# Patient Record
Sex: FEMALE | Race: WHITE | NOT HISPANIC OR LATINO | Employment: FULL TIME | URBAN - METROPOLITAN AREA
[De-identification: names, ages, dates, MRNs, and addresses within clinical notes are randomized per-mention and may not be internally consistent; named-entity substitution may affect disease eponyms.]

---

## 2017-03-07 ENCOUNTER — OFFICE VISIT (OUTPATIENT)
Dept: GASTROENTEROLOGY | Facility: CLINIC | Age: 37
End: 2017-03-07
Payer: MEDICAID

## 2017-03-07 ENCOUNTER — LAB VISIT (OUTPATIENT)
Dept: LAB | Facility: HOSPITAL | Age: 37
End: 2017-03-07
Attending: NURSE PRACTITIONER
Payer: MEDICAID

## 2017-03-07 VITALS
SYSTOLIC BLOOD PRESSURE: 112 MMHG | RESPIRATION RATE: 20 BRPM | WEIGHT: 121.5 LBS | BODY MASS INDEX: 20.74 KG/M2 | DIASTOLIC BLOOD PRESSURE: 79 MMHG | HEART RATE: 85 BPM | HEIGHT: 64 IN

## 2017-03-07 DIAGNOSIS — R10.9 LEFT SIDED ABDOMINAL PAIN: ICD-10-CM

## 2017-03-07 DIAGNOSIS — Z87.19 HISTORY OF HEMORRHOIDS: ICD-10-CM

## 2017-03-07 DIAGNOSIS — R10.819 SUPRAPUBIC TENDERNESS: ICD-10-CM

## 2017-03-07 DIAGNOSIS — K92.1 HEMATOCHEZIA: ICD-10-CM

## 2017-03-07 DIAGNOSIS — Z80.0 FAMILY HISTORY OF COLON CANCER: ICD-10-CM

## 2017-03-07 DIAGNOSIS — K92.1 HEMATOCHEZIA: Primary | ICD-10-CM

## 2017-03-07 LAB
ALBUMIN SERPL BCP-MCNC: 4.2 G/DL
ALP SERPL-CCNC: 52 U/L
ALT SERPL W/O P-5'-P-CCNC: 6 U/L
ANION GAP SERPL CALC-SCNC: 7 MMOL/L
AST SERPL-CCNC: 19 U/L
B-HCG UR QL: NEGATIVE
BACTERIA #/AREA URNS HPF: NORMAL /HPF
BASOPHILS # BLD AUTO: 0 K/UL
BASOPHILS NFR BLD: 0.3 %
BILIRUB SERPL-MCNC: 0.5 MG/DL
BILIRUB UR QL STRIP: NEGATIVE
BUN SERPL-MCNC: 8 MG/DL
CALCIUM SERPL-MCNC: 9.2 MG/DL
CHLORIDE SERPL-SCNC: 104 MMOL/L
CLARITY UR: CLEAR
CO2 SERPL-SCNC: 28 MMOL/L
COLOR UR: YELLOW
CREAT SERPL-MCNC: 0.7 MG/DL
DIFFERENTIAL METHOD: ABNORMAL
EOSINOPHIL # BLD AUTO: 0.1 K/UL
EOSINOPHIL NFR BLD: 1.2 %
ERYTHROCYTE [DISTWIDTH] IN BLOOD BY AUTOMATED COUNT: 13.4 %
EST. GFR  (AFRICAN AMERICAN): >60 ML/MIN/1.73 M^2
EST. GFR  (NON AFRICAN AMERICAN): >60 ML/MIN/1.73 M^2
GLUCOSE SERPL-MCNC: 75 MG/DL
GLUCOSE UR QL STRIP: NEGATIVE
HCT VFR BLD AUTO: 41.8 %
HGB BLD-MCNC: 13.8 G/DL
HGB UR QL STRIP: ABNORMAL
KETONES UR QL STRIP: NEGATIVE
LEUKOCYTE ESTERASE UR QL STRIP: NEGATIVE
LYMPHOCYTES # BLD AUTO: 2.2 K/UL
LYMPHOCYTES NFR BLD: 28.7 %
MCH RBC QN AUTO: 30.5 PG
MCHC RBC AUTO-ENTMCNC: 33.1 %
MCV RBC AUTO: 92 FL
MICROSCOPIC COMMENT: NORMAL
MONOCYTES # BLD AUTO: 0.4 K/UL
MONOCYTES NFR BLD: 5 %
NEUTROPHILS # BLD AUTO: 5 K/UL
NEUTROPHILS NFR BLD: 64.8 %
NITRITE UR QL STRIP: NEGATIVE
PH UR STRIP: 6 [PH] (ref 5–8)
PLATELET # BLD AUTO: 259 K/UL
PMV BLD AUTO: 7.4 FL
POTASSIUM SERPL-SCNC: 3.8 MMOL/L
PROT SERPL-MCNC: 6.6 G/DL
PROT UR QL STRIP: NEGATIVE
RBC # BLD AUTO: 4.54 M/UL
RBC #/AREA URNS HPF: 1 /HPF (ref 0–4)
SODIUM SERPL-SCNC: 139 MMOL/L
SP GR UR STRIP: <=1.005 (ref 1–1.03)
URN SPEC COLLECT METH UR: ABNORMAL
UROBILINOGEN UR STRIP-ACNC: NEGATIVE EU/DL
WBC # BLD AUTO: 7.6 K/UL

## 2017-03-07 PROCEDURE — 99213 OFFICE O/P EST LOW 20 MIN: CPT | Mod: PBBFAC,PO | Performed by: NURSE PRACTITIONER

## 2017-03-07 PROCEDURE — 99999 PR PBB SHADOW E&M-EST. PATIENT-LVL III: CPT | Mod: PBBFAC,,, | Performed by: NURSE PRACTITIONER

## 2017-03-07 PROCEDURE — 81000 URINALYSIS NONAUTO W/SCOPE: CPT

## 2017-03-07 PROCEDURE — 81025 URINE PREGNANCY TEST: CPT

## 2017-03-07 PROCEDURE — 80053 COMPREHEN METABOLIC PANEL: CPT

## 2017-03-07 PROCEDURE — 36415 COLL VENOUS BLD VENIPUNCTURE: CPT

## 2017-03-07 PROCEDURE — 99214 OFFICE O/P EST MOD 30 MIN: CPT | Mod: S$PBB,,, | Performed by: NURSE PRACTITIONER

## 2017-03-07 PROCEDURE — 85025 COMPLETE CBC W/AUTO DIFF WBC: CPT

## 2017-03-07 RX ORDER — HYDROCORTISONE ACETATE 25 MG/1
25 SUPPOSITORY RECTAL 2 TIMES DAILY
Qty: 20 SUPPOSITORY | Refills: 0 | Status: SHIPPED | OUTPATIENT
Start: 2017-03-07 | End: 2017-03-10

## 2017-03-07 NOTE — PROGRESS NOTES
Subjective:       Patient ID: Mehnaz Taveras is a 36 y.o. female Body mass index is 20.85 kg/(m^2).    Chief Complaint: Rectal Bleeding    This patient is new to me.  Established patient of Dr. Rojas.    GI Problem   The primary symptoms include abdominal pain (lower abdomen, denies currently) and hematochezia. Primary symptoms do not include fever, weight loss, fatigue, nausea, vomiting, diarrhea, melena, hematemesis or dysuria. Episode onset: started in 6/2016 and saw Dr. Rojas in 7/2016; had colonoscopy and bleeding had improved but never completely resolved; increased amount and frequency over the past 3 weeks. The problem has been gradually worsening.   The abdominal pain began more than 2 days ago (started about 2-3 weeks ago; prior ct scan had showed colitis (this was done prior to the colonoscopy)). The abdominal pain has been unchanged since its onset. Pain location: left-sided and lower abomen. The abdominal pain does not radiate. The severity of the abdominal pain is 0/10. The abdominal pain is relieved by nothing.   The hematochezia began more than 1 week ago. The hematochezia has occurred 2 to 5 times per day (bright red blood with each bowel movement which is about 5 times a day; denies bleeding in between bowel movements; reports blood noted in stool, on toilet paper and in toilet bowl; occasional blood clots noted). The hematochezia is a recurrent problem.   The illness does not include chills, anorexia, dysphagia, odynophagia or constipation. Significant associated medical issues include hemorrhoids. Associated medical issues do not include inflammatory bowel disease, GERD, bowel resection, irritable bowel syndrome or diverticulitis.     Review of Systems   Constitutional: Negative for appetite change, chills, fatigue, fever, unexpected weight change and weight loss.   HENT: Negative for sore throat and trouble swallowing.    Respiratory: Negative for cough, choking, chest tightness and  "shortness of breath.    Cardiovascular: Negative for chest pain and palpitations.   Gastrointestinal: Positive for abdominal pain (lower abdomen, denies currently), anal bleeding, blood in stool and hematochezia. Negative for anorexia, constipation, diarrhea, dysphagia, hematemesis, melena, nausea, rectal pain and vomiting.   Genitourinary: Negative for difficulty urinating, dysuria, flank pain, frequency, pelvic pain and urgency.   Neurological: Negative for weakness.       Past Medical History:   Diagnosis Date    Seizures     last seizure approx 1 year ago    Smoker      Past Surgical History:   Procedure Laterality Date    COLONOSCOPY N/A 8/8/2016    Procedure: COLONOSCOPY;  Surgeon: Henri Rojas MD;  Location: [facility];  Service: Endoscopy;  Laterality: N/A; repeat in 5 years    septal surgery      TONSILLECTOMY      TUBAL LIGATION      UPPER GASTROINTESTINAL ENDOSCOPY  08/17/2016    Dr. Rojas     Family History   Problem Relation Age of Onset    Cancer Mother      breast & uterine    Colon cancer Maternal Aunt 32    Colon cancer Maternal Grandfather      diagnosed in his 50's    Crohn's disease Neg Hx     Ulcerative colitis Neg Hx      Wt Readings from Last 10 Encounters:   03/07/17 55.1 kg (121 lb 7.6 oz)   08/08/16 54.4 kg (120 lb)   07/28/16 52 kg (114 lb 10.2 oz)     8/8/16 Colonoscopy was reviewed and procedure report states:   " Impression:          - Non-bleeding internal hemorrhoids.                       - The rectum, sigmoid colon, descending colon,                        transverse colon, ascending colon, cecum,                        appendiceal orifice and ileocecal valve are normal.                       - The examined portion of the ileum was normal.                       - No specimens collected.  Recommendation:      - Patient has a contact number available for                        emergencies. The signs and symptoms of potential                        delayed " "complications were discussed with the                        patient. Return to normal activities tomorrow.                        Written discharge instructions were provided to the                        patient.                       - High fiber diet.                       - Continue present medications.                       - Repeat colonoscopy in 5 years for screening                        purposes.                       - Discharge patient to home (ambulatory).                       - Perform an upper GI endoscopy at appointment to be                        scheduled.                       - Return to my office after studies are complete. ".    8/17/16 EGD was reviewed and procedure report states:   " Impression:          - Normal esophagus.                       - Z-line regular, 36 cm from the incisors.                       - Medium-sized hiatus hernia.                       - Gastritis. Biopsied.                       - Normal examined duodenum. Biopsied.  Recommendation:      - Patient has a contact number available for                        emergencies. The signs and symptoms of potential                        delayed complications were discussed with the                        patient. Return to normal activities tomorrow.                        Written discharge instructions were provided to the                        patient.                       - Resume previous diet.                       - Continue present medications.                       - No aspirin, ibuprofen, naproxen, or other                        non-steroidal anti-inflammatory drugs.                       - Await pathology results.                       - Discharge patient to home (ambulatory).                       - Return to my office in 6 weeks. ".  Biopsy results:   "1. Duodenum, biopsy:  Unremarkable small bowel mucosa with adequate villi.  Negative for active inflammation.  Negative for dysplasia.  2. Stomach, antrum and " "body, biopsy:  Mild chronic nonactive gastritis.  Negative for H. pylori by routine stain.  Negative for intestinal metaplasia and dysplasia."    Objective:      Physical Exam   Constitutional: She is oriented to person, place, and time. She appears well-developed and well-nourished. No distress.   HENT:   Mouth/Throat: Oropharynx is clear and moist and mucous membranes are normal. No oral lesions. No oropharyngeal exudate.   Eyes: Conjunctivae are normal. Pupils are equal, round, and reactive to light. No scleral icterus.   Neck: Normal range of motion. Neck supple. No tracheal deviation present.   Cardiovascular: Normal rate.    Pulmonary/Chest: Effort normal and breath sounds normal. No respiratory distress. She has no wheezes.   Abdominal: Soft. Normal appearance and bowel sounds are normal. She exhibits no distension, no abdominal bruit, no ascites and no mass. There is no hepatosplenomegaly. There is tenderness (mild) in the suprapubic area. There is no rigidity, no rebound, no guarding, no tenderness at McBurney's point and negative Rizzo's sign. No hernia.   Patient declined rectal exam.   Lymphadenopathy:     She has no cervical adenopathy.   Neurological: She is alert and oriented to person, place, and time.   Skin: Skin is warm and dry. No rash noted. She is not diaphoretic. No erythema. No pallor.   Non-jaundiced   Psychiatric: She has a normal mood and affect. Her behavior is normal.   Nursing note and vitals reviewed.      Assessment:       1. Hematochezia    2. History of hemorrhoids    3. Family history of colon cancer    4. Left sided abdominal pain    5. Suprapubic tenderness        Plan:       Hematochezia  -     CBC auto differential; Future; Expected date: 3/7/17  -     CT Abdomen Pelvis With Contrast; Future; Expected date: 3/7/17  - discussed about different etiologies that can cause rectal bleeding, such as diverticulosis, polyps, colon inflammation or infection, anal fissure or hemorrhoids. "   - discussed results of recent colonoscopy, patient verbalized understanding and declined lower endoscopy for now and would like to try to treat the hemorrhoid first.  - You may resume normal activity as long as you feel well.  - Avoid/minimize aspirin and anti-inflammatory drugs such as ibuprofen (Advil, Motrin) and naproxen (Aleve and Naprosyn).  - Avoid alcohol.    History of hemorrhoids  -  START  hydrocortisone (ANUSOL-HC) 25 mg suppository; Place 1 suppository (25 mg total) rectally 2 (two) times daily.  Dispense: 20 suppository; Refill: 0  - avoid constipation and straining with bowel movements; try using an OTC stool softener as directed and increase fiber in diet (20-30 grams daily)  - recommend SITZ baths  - possible referral to general surgery if symptoms persist    Family history of colon cancer  - next surveillance colonoscopy due 8/2021    Left sided abdominal pain  -     CBC auto differential; Future; Expected date: 3/7/17  -     Comprehensive metabolic panel; Future; Expected date: 3/7/17  -     CT Abdomen Pelvis With Contrast; Future; Expected date: 3/7/17  -     Pregnancy, urine rapid; Future; Expected date: 3/7/17  -     Urinalysis; Future; Expected date: 3/7/17    Suprapubic tenderness  -     CBC auto differential; Future; Expected date: 3/7/17  -     Comprehensive metabolic panel; Future; Expected date: 3/7/17  -     CT Abdomen Pelvis With Contrast; Future; Expected date: 3/7/17  -     Pregnancy, urine rapid; Future; Expected date: 3/7/17  -     Urinalysis; Future; Expected date: 3/7/17    Return in about 1 month (around 4/7/2017), or if symptoms worsen or fail to improve.    If no improvement in symptoms or symptoms worsen, call/follow-up at clinic or go to ER.

## 2017-03-07 NOTE — MR AVS SNAPSHOT
[facility]  [address]  [place] [address]  Phone: [phone]                  Mehnaz Taveras   3/7/2017 1:30 PM   Office Visit    Description:  Female : 1980   Provider:  MAIDA Davis   Department:  [facility]           Reason for Visit     Rectal Bleeding           Diagnoses this Visit        Comments    Hematochezia    -  Primary     History of hemorrhoids         Family history of colon cancer         Left sided abdominal pain         Suprapubic tenderness                To Do List           Goals (5 Years of Data)     None      Follow-Up and Disposition     Return in about 1 month (around 2017), or if symptoms worsen or fail to improve.       These Medications        Disp Refills Start End    hydrocortisone (ANUSOL-HC) 25 mg suppository 20 suppository 0 3/7/2017 3/17/2017    Place 1 suppository (25 mg total) rectally 2 (two) times daily. - Rectal    Pharmacy: [id number] - [place] - [id number]  #: [phone]         [facility]     [facility] On [facility] Nurse Care Line -  Assistance  Registered nurses in the [facility] Center provide clinical advisement, health education, appointment booking, and other advisory services.  Call for this free service at [phone].             Medications           Message regarding Medications     Verify the changes and/or additions to your medication regime listed below are the same as discussed with your clinician today.  If any of these changes or additions are incorrect, please notify your healthcare provider.        START taking these NEW medications        Refills    hydrocortisone (ANUSOL-HC) 25 mg suppository 0    Sig: Place 1 suppository (25 mg total) rectally 2 (two) times daily.    Class: Normal    Route: Rectal      STOP taking these medications     ciprofloxacin HCl (CIPRO) 500 MG tablet     metronidazole (FLAGYL) 500 MG tablet      "       Verify that the below list of medications is an accurate representation of the medications you are currently taking.  If none reported, the list may be blank. If incorrect, please contact your healthcare provider. Carry this list with you in case of emergency.           Current Medications     hydrocortisone (ANUSOL-HC) 25 mg suppository Place 1 suppository (25 mg total) rectally 2 (two) times daily.           Clinical Reference Information           Your Vitals Were     BP Pulse Resp Height Weight Last Period    112/79 (BP Location: Right arm, Patient Position: Sitting, BP Method: Automatic) 85 20 5' 4" (1.626 m) 55.1 kg (121 lb 7.6 oz) 03/07/2017    BMI                20.85 kg/m2          Blood Pressure          Most Recent Value    BP  112/79      Allergies as of 3/7/2017     No Known Allergies      Immunizations Administered on Date of Encounter - 3/7/2017     None      Orders Placed During Today's Visit     Future Labs/Procedures Expected by Expires    CBC auto differential  3/7/2017 5/6/2018    Comprehensive metabolic panel  3/7/2017 5/6/2018    CT Abdomen Pelvis With Contrast  3/7/2017 3/7/2018    Pregnancy, urine rapid  3/7/2017 5/6/2018    Urinalysis  3/7/2017 5/6/2018      [facility] Sign-Up     Activating your [facility] account is as easy as 1-2-3!     1) Visit [email or web address], select Sign Up Now, enter this activation code and your date of birth, then select Next.  [email or web address]  Expires: 4/21/2017  2:10 PM      2) Create a username and password to use when you visit [facility] in the future and select a security question in case you lose your password and select Next.    3) Enter your e-mail address and click Sign Up!    Additional Information  If you have questions, please e-mail [email or web address] or call [phone] to talk to our [facility] staff. Remember, [facility] is NOT to be used for urgent needs. For medical emergencies, dial 911.         Instructions      Lower GI Bleeding " (Stable)  You have signs of blood in your stool. This is called rectal bleeding. The bleeding may have begun in another part of your gastrointestinal (GI) tract. If the blood is bright red, it is likely coming from the lower part of the GI tract. If the blood is black or dark, it might be coming from higher up in the GI tract. Very small amounts of GI bleeding may not be visible and can only be discovered during a test on your stool. Possible causes of lower GI bleeding include:  · Hemorrhoids  · Anal fissures  · Diverticulitis  · Inflammatory bowel disease (Crohn's disease or ulcerative colitis)  · Polyps (growths) in the intestine  Note: Iron supplements and medicines for diarrhea or upset stomach can cause black stools. Foods such as licorice and red beets can also discolor the stool and be mistaken for bleeding. These are not bleeding and are not a cause for alarm.  Home care  You have not lost a large amount of blood and your condition appears stable at this time. You may resume normal activity as long as you feel well.  Avoid NSAIDs, such as aspirin, ibuprofen, or naproxen. They can irritate the stomach and cause further bleeding. If you are taking these medicines for other medical reasons, talk to your healthcare provider before you stop them.   Follow-up care  Follow up with your healthcare provider as advised. Further tests may be needed to find the cause of your bleeding.  When to seek medical advice  Call your healthcare provider for any of the following:  · Large amount of rectal bleeding   · Increasing abdominal pain  · Weakness, dizziness  Call 911  Get emergency medical care if any of the following occur:  · Loss of consciousness  · Vomiting blood  Date Last Reviewed: 6/24/2015 © 2000-2016 [employer]. [address] [place] [address], [place] [address]. All rights reserved. This information is not intended as a substitute for professional medical care. Always follow your healthcare  professional's instructions.        Hemorrhoids    Hemorrhoids are swollen and inflamed veins inside the rectum and near the anus. The rectum is the last several inches of the colon. The anus is the passage between the rectum and the outside of the body.  Causes  The veins can become swollen due to increased pressure in them. This is most often caused by:  · Chronic constipation or diarrhea  · Straining when having a bowel movement  · Sitting too long on the toilet  · A low-fiber diet  · Pregnancy  Symptoms  · Bleeding from the rectum (this may be noticeable after bowel movements)  · Lump near the anus  · Itching around the anus  · Pain around the anus  There are different types of hemorrhoids. Depending on the type you have and the severity, you may be able to treat yourself at home. In some cases, a procedure may be the best treatment option. Your healthcare provider can tell you more about this, if needed.  Home care  General care  · To get relief from pain or itching, try:  ¨ Topical products. Your healthcare provider may prescribe or recommend creams, ointments, or pads that can be applied to the hemorrhoid. Use these exactly as directed.  ¨ Medicines. Your healthcare provider may recommend stool softeners, suppositories, or laxatives to help manage constipation. Use these exactly as directed.  ¨ Sitz baths. A sitz bath involves sitting in a few inches of warm bath water. Be careful not to make the water so hot that you burn yourself--test it before sitting in it. Soak for about 10 to 15 minutes a few times a day. This may help relieve pain.  Tips to help prevent hemorrhoids  · Eat more fiber. Fiber adds bulk to stool and absorbs water as it moves through your colon. This makes stool softer and easier to pass.  ¨ Increase the fiber in your diet with more fiber-rich foods. These include fresh fruit, vegetables, and whole grains.  ¨ Take a fiber supplement or bulking agent, if advised to by your provider. These  include products such as psyllium or methylcellulose.  · Drink plenty of water, if directed to by your provider. This can help keep stool soft.  · Be more active. Frequent exercise aids digestion and helps prevent constipation. It may also help make bowel movements more regular.  · Dont strain during bowel movements. This can make hemorrhoids more likely. Also, dont sit on the toilet for long periods of time.  Follow-up care  Follow up with your healthcare provider, or as advised. If a culture or imaging tests were done, you will be notified of the results when they are ready. This may take a few days or longer.  When to seek medical advice  Call your healthcare provider right away if any of these occur:  · Increased bleeding from the rectum  · Increased pain around the rectum or anus  · Weakness or dizziness  Call 911  Call 911 or return to the emergency department right away if any of these occur:  · Trouble breathing or swallowing  · Fainting or loss of consciousness  · Unusually fast heart rate  · Vomiting blood  · Large amounts of blood in stool  Date Last Reviewed: 6/22/2015 © 2000-2016 [employer]. [address] [place] [address]. All rights reserved. This information is not intended as a substitute for professional medical care. Always follow your healthcare professional's instructions.             Smoking Cessation     If you would like to quit smoking:   You may be eligible for free services if you are a [place] resident and started smoking cigarettes before September 1, 1988.  Call the [employer]oking [employer]essation [employer] ([employer]) toll free at (707) 255-9927 or (107) 466-1106.   Call 1-800-QUIT-NOW if you do not meet the above criteria.            Language Assistance Services     ATTENTION: Language assistance services are available, free of charge. Please call [phone].      ATENCIÓN: Si habla español, tiene a tamez disposición servicios gratuitos de asistencia lingüística. Llame al  [phone].     ESTEFANÍA Ý: N?u b?n nói Ti?ng Vi?t, có các d?ch v? h? tr? ngôn ng? mi?n phí dành cho b?n. G?i s? [phone].         [facility] complies with applicable Federal civil rights laws and does not discriminate on the basis of race, color, national origin, age, disability, or sex.

## 2017-03-07 NOTE — PATIENT INSTRUCTIONS
Lower GI Bleeding (Stable)  You have signs of blood in your stool. This is called rectal bleeding. The bleeding may have begun in another part of your gastrointestinal (GI) tract. If the blood is bright red, it is likely coming from the lower part of the GI tract. If the blood is black or dark, it might be coming from higher up in the GI tract. Very small amounts of GI bleeding may not be visible and can only be discovered during a test on your stool. Possible causes of lower GI bleeding include:  · Hemorrhoids  · Anal fissures  · Diverticulitis  · Inflammatory bowel disease (Crohn's disease or ulcerative colitis)  · Polyps (growths) in the intestine  Note: Iron supplements and medicines for diarrhea or upset stomach can cause black stools. Foods such as licorice and red beets can also discolor the stool and be mistaken for bleeding. These are not bleeding and are not a cause for alarm.  Home care  You have not lost a large amount of blood and your condition appears stable at this time. You may resume normal activity as long as you feel well.  Avoid NSAIDs, such as aspirin, ibuprofen, or naproxen. They can irritate the stomach and cause further bleeding. If you are taking these medicines for other medical reasons, talk to your healthcare provider before you stop them.   Follow-up care  Follow up with your healthcare provider as advised. Further tests may be needed to find the cause of your bleeding.  When to seek medical advice  Call your healthcare provider for any of the following:  · Large amount of rectal bleeding   · Increasing abdominal pain  · Weakness, dizziness  Call 911  Get emergency medical care if any of the following occur:  · Loss of consciousness  · Vomiting blood  Date Last Reviewed: 6/24/2015  © 8244-8023 [employer]. [address] [place] [address]. All rights reserved. This information is not intended as a substitute for professional medical care. Always follow your  healthcare professional's instructions.        Hemorrhoids    Hemorrhoids are swollen and inflamed veins inside the rectum and near the anus. The rectum is the last several inches of the colon. The anus is the passage between the rectum and the outside of the body.  Causes  The veins can become swollen due to increased pressure in them. This is most often caused by:  · Chronic constipation or diarrhea  · Straining when having a bowel movement  · Sitting too long on the toilet  · A low-fiber diet  · Pregnancy  Symptoms  · Bleeding from the rectum (this may be noticeable after bowel movements)  · Lump near the anus  · Itching around the anus  · Pain around the anus  There are different types of hemorrhoids. Depending on the type you have and the severity, you may be able to treat yourself at home. In some cases, a procedure may be the best treatment option. Your healthcare provider can tell you more about this, if needed.  Home care  General care  · To get relief from pain or itching, try:  ¨ Topical products. Your healthcare provider may prescribe or recommend creams, ointments, or pads that can be applied to the hemorrhoid. Use these exactly as directed.  ¨ Medicines. Your healthcare provider may recommend stool softeners, suppositories, or laxatives to help manage constipation. Use these exactly as directed.  ¨ Sitz baths. A sitz bath involves sitting in a few inches of warm bath water. Be careful not to make the water so hot that you burn yourself--test it before sitting in it. Soak for about 10 to 15 minutes a few times a day. This may help relieve pain.  Tips to help prevent hemorrhoids  · Eat more fiber. Fiber adds bulk to stool and absorbs water as it moves through your colon. This makes stool softer and easier to pass.  ¨ Increase the fiber in your diet with more fiber-rich foods. These include fresh fruit, vegetables, and whole grains.  ¨ Take a fiber supplement or bulking agent, if advised to by your  provider. These include products such as psyllium or methylcellulose.  · Drink plenty of water, if directed to by your provider. This can help keep stool soft.  · Be more active. Frequent exercise aids digestion and helps prevent constipation. It may also help make bowel movements more regular.  · Dont strain during bowel movements. This can make hemorrhoids more likely. Also, dont sit on the toilet for long periods of time.  Follow-up care  Follow up with your healthcare provider, or as advised. If a culture or imaging tests were done, you will be notified of the results when they are ready. This may take a few days or longer.  When to seek medical advice  Call your healthcare provider right away if any of these occur:  · Increased bleeding from the rectum  · Increased pain around the rectum or anus  · Weakness or dizziness  Call 911  Call 911 or return to the emergency department right away if any of these occur:  · Trouble breathing or swallowing  · Fainting or loss of consciousness  · Unusually fast heart rate  · Vomiting blood  · Large amounts of blood in stool  Date Last Reviewed: 6/22/2015  © 8250-9917 [employer]. [address] [place] [address], [place] [address]. All rights reserved. This information is not intended as a substitute for professional medical care. Always follow your healthcare professional's instructions.

## 2017-03-08 ENCOUNTER — HOSPITAL ENCOUNTER (OUTPATIENT)
Dept: RADIOLOGY | Facility: HOSPITAL | Age: 37
Discharge: HOME | End: 2017-03-08
Attending: NURSE PRACTITIONER
Payer: MEDICAID

## 2017-03-08 DIAGNOSIS — R10.819 SUPRAPUBIC TENDERNESS: ICD-10-CM

## 2017-03-08 DIAGNOSIS — R10.9 LEFT SIDED ABDOMINAL PAIN: ICD-10-CM

## 2017-03-08 DIAGNOSIS — K92.1 HEMATOCHEZIA: ICD-10-CM

## 2017-03-08 PROCEDURE — 25500020 PHARM REV CODE 255

## 2017-03-08 PROCEDURE — 74177 CT ABD & PELVIS W/CONTRAST: CPT | Mod: 26,,, | Performed by: RADIOLOGY

## 2017-03-08 PROCEDURE — 74177 CT ABD & PELVIS W/CONTRAST: CPT | Mod: TC

## 2017-03-08 RX ADMIN — IOHEXOL 30 ML: 350 INJECTION, SOLUTION INTRAVENOUS at 02:03

## 2017-03-08 RX ADMIN — IOHEXOL 75 ML: 350 INJECTION, SOLUTION INTRAVENOUS at 02:03

## 2017-03-10 ENCOUNTER — TELEPHONE (OUTPATIENT)
Dept: GASTROENTEROLOGY | Facility: CLINIC | Age: 37
End: 2017-03-10

## 2017-03-10 DIAGNOSIS — R10.819 SUPRAPUBIC TENDERNESS: ICD-10-CM

## 2017-03-10 DIAGNOSIS — R10.9 LEFT SIDED ABDOMINAL PAIN: ICD-10-CM

## 2017-03-10 DIAGNOSIS — K92.1 HEMATOCHEZIA: ICD-10-CM

## 2017-03-10 DIAGNOSIS — K76.9 LIVER LESION: Primary | ICD-10-CM

## 2017-03-10 DIAGNOSIS — Z87.19 HISTORY OF HEMORRHOIDS: ICD-10-CM

## 2017-03-10 NOTE — TELEPHONE ENCOUNTER
Please call to inform & review the results with the patient- blood work showed no anemia, , good kidney and liver function levels, normal electrolytes, urinalysis showed occult positive blood in urine otherwise normal findings; recommend follow-up with PCP for continued evaluation and management of this finding. Urine pregnancy test negative.    CT scan showed no acute findings to explain symptoms overall. Liver showed small lesions nonspecific and need to further evaluate with ultrasound (possible hemangiomas which are typically benign collection of blood vessels). Abdominal & pelvis ultrasounds are in please schedule patient for this to further evaluate findings and symptoms.  Moderate amount of stool in colon which can suggest constipation. Recommended daily exercise, adequate water intake (six 8-oz glasses of water daily), and high fiber diet. OTC fiber supplements are recommended if diet does not reach daily fiber goal (25 grams daily), such as Metamucil, Citrucel, or FiberCon (take as directed, separate from other oral medications by >2 hours).  Otherwise normal findings. Continue with previous recommendations and the above    Thanks,  Raven CRUZ

## 2017-03-10 NOTE — TELEPHONE ENCOUNTER
Review test results and recommendations with pt. Scheduled ultrasounds. Inform pt of prescription change. Pt verbally understood.

## 2017-03-10 NOTE — TELEPHONE ENCOUNTER
Please call and inform patient that i sent in a prescription for proctofoam and go over results and recommendations of ct scan found in other note in this encounter.  Thanks  KASI

## 2017-03-10 NOTE — TELEPHONE ENCOUNTER
----- Message from Mati Ruiz LPN sent at 3/10/2017 12:21 PM CST -----  Contact: felix  Can Rx change to proctofoam? Or try OTC suppositories?  ----- Message -----     From: Bryant Wiseman     Sent: 3/10/2017   9:40 AM       To: [facility] Staff    Rx hydrocortisone (ANUSOL-HC) 25 mg suppository is not approved by insurance. Looking to see if she can have something else sent to     [id number] - [place] - [address]  [address] [place] [address]  Phone: [phone] Fax: [phone]    Please call also for CT SCAN results. [phone]. Thank you!

## 2017-03-16 ENCOUNTER — HOSPITAL ENCOUNTER (EMERGENCY)
Facility: HOSPITAL | Age: 37
Discharge: HOME | End: 2017-03-16
Attending: EMERGENCY MEDICINE
Payer: MEDICAID

## 2017-03-16 VITALS
HEIGHT: 64 IN | BODY MASS INDEX: 21.17 KG/M2 | DIASTOLIC BLOOD PRESSURE: 68 MMHG | TEMPERATURE: 97 F | HEART RATE: 80 BPM | WEIGHT: 124 LBS | SYSTOLIC BLOOD PRESSURE: 120 MMHG | OXYGEN SATURATION: 100 % | RESPIRATION RATE: 16 BRPM

## 2017-03-16 DIAGNOSIS — R07.89 ANTERIOR CHEST WALL PAIN: ICD-10-CM

## 2017-03-16 DIAGNOSIS — S49.91XA RIGHT SHOULDER INJURY, INITIAL ENCOUNTER: Primary | ICD-10-CM
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
## 2017-03-16 LAB
B-HCG UR QL: NEGATIVE
BACTERIA #/AREA URNS HPF: ABNORMAL /HPF
BILIRUB UR QL STRIP: NEGATIVE
CLARITY UR: CLEAR
COLOR UR: YELLOW
CTP QC/QA: YES
GLUCOSE UR QL STRIP: NEGATIVE
HGB UR QL STRIP: ABNORMAL
KETONES UR QL STRIP: NEGATIVE
LEUKOCYTE ESTERASE UR QL STRIP: NEGATIVE
MICROSCOPIC COMMENT: ABNORMAL
NITRITE UR QL STRIP: NEGATIVE
PH UR STRIP: 7 [PH] (ref 5–8)
PROT UR QL STRIP: NEGATIVE
RBC #/AREA URNS HPF: 5 /HPF (ref 0–4)
SP GR UR STRIP: 1.01 (ref 1–1.03)
SQUAMOUS #/AREA URNS HPF: 30 /HPF
URN SPEC COLLECT METH UR: ABNORMAL
UROBILINOGEN UR STRIP-ACNC: NEGATIVE EU/DL
WBC #/AREA URNS HPF: 2 /HPF (ref 0–5)

## 2017-03-16 PROCEDURE — 25500020 PHARM REV CODE 255

## 2017-03-16 PROCEDURE — 99285 EMERGENCY DEPT VISIT HI MDM: CPT | Mod: 25

## 2017-03-16 PROCEDURE — 25000003 PHARM REV CODE 250: Performed by: EMERGENCY MEDICINE

## 2017-03-16 PROCEDURE — 81025 URINE PREGNANCY TEST: CPT | Performed by: EMERGENCY MEDICINE

## 2017-03-16 PROCEDURE — 81000 URINALYSIS NONAUTO W/SCOPE: CPT

## 2017-03-16 RX ORDER — HYDROCODONE BITARTRATE AND ACETAMINOPHEN 5; 325 MG/1; MG/1
1 TABLET ORAL EVERY 8 HOURS PRN
Qty: 12 TABLET | Refills: 0 | Status: SHIPPED | OUTPATIENT
Start: 2017-03-16 | End: 2017-03-20

## 2017-03-16 RX ORDER — DIAZEPAM 5 MG/1
5 TABLET ORAL
Status: COMPLETED | OUTPATIENT
Start: 2017-03-16 | End: 2017-03-16

## 2017-03-16 RX ORDER — ACETAMINOPHEN 500 MG
1000 TABLET ORAL
Status: COMPLETED | OUTPATIENT
Start: 2017-03-16 | End: 2017-03-16

## 2017-03-16 RX ADMIN — DIAZEPAM 5 MG: 5 TABLET ORAL at 10:03

## 2017-03-16 RX ADMIN — IOHEXOL 75 ML: 350 INJECTION, SOLUTION INTRAVENOUS at 09:03

## 2017-03-16 RX ADMIN — ACETAMINOPHEN 1000 MG: 500 TABLET, FILM COATED ORAL at 10:03
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
## 2017-03-18 ENCOUNTER — HOSPITAL ENCOUNTER (OUTPATIENT)
Dept: RADIOLOGY | Facility: HOSPITAL | Age: 37
Discharge: HOME | End: 2017-03-18
Attending: NURSE PRACTITIONER
Payer: MEDICAID

## 2017-03-18 DIAGNOSIS — R10.9 LEFT SIDED ABDOMINAL PAIN: ICD-10-CM

## 2017-03-18 DIAGNOSIS — R10.819 SUPRAPUBIC TENDERNESS: ICD-10-CM

## 2017-03-18 DIAGNOSIS — K76.9 LIVER LESION: ICD-10-CM

## 2017-03-18 PROCEDURE — 76856 US EXAM PELVIC COMPLETE: CPT | Mod: 26,,, | Performed by: RADIOLOGY

## 2017-03-18 PROCEDURE — 76700 US EXAM ABDOM COMPLETE: CPT | Mod: 26,,, | Performed by: RADIOLOGY

## 2017-03-18 PROCEDURE — 76856 US EXAM PELVIC COMPLETE: CPT | Mod: TC

## 2017-03-18 PROCEDURE — 76830 TRANSVAGINAL US NON-OB: CPT | Mod: 26,,, | Performed by: RADIOLOGY

## 2017-03-18 PROCEDURE — 76700 US EXAM ABDOM COMPLETE: CPT | Mod: TC

## 2017-03-20 ENCOUNTER — TELEPHONE (OUTPATIENT)
Dept: GASTROENTEROLOGY | Facility: CLINIC | Age: 37
End: 2017-03-20

## 2017-03-20 DIAGNOSIS — K92.1 HEMATOCHEZIA: ICD-10-CM

## 2017-03-20 DIAGNOSIS — R19.4 CHANGE IN BOWEL HABITS: ICD-10-CM

## 2017-03-20 DIAGNOSIS — R10.819 SUPRAPUBIC TENDERNESS: ICD-10-CM

## 2017-03-20 DIAGNOSIS — K62.5 RECTAL BLEEDING: ICD-10-CM

## 2017-03-20 DIAGNOSIS — R10.9 LEFT SIDED ABDOMINAL PAIN: ICD-10-CM

## 2017-03-20 DIAGNOSIS — R63.4 WEIGHT LOSS: ICD-10-CM

## 2017-03-20 DIAGNOSIS — K76.9 LIVER LESION: Primary | ICD-10-CM

## 2017-03-20 DIAGNOSIS — Z87.19 HISTORY OF HEMORRHOIDS: ICD-10-CM

## 2017-03-20 NOTE — TELEPHONE ENCOUNTER
Please call to inform & review the results with the patient- abdominal ultrasound showed contracted gallbladder. Otherwise normal findings. Small Lesion to the liver which is most likely a hemangioma (collection of blood vessels that is usually benign- can confirm with mri). Lesion of the liver from ct scan aren't visualized on the ultrasound. Otherwise normal findings of the abdomen on ultrasound. Possible HIDA scan if symptoms persist and pending results of testing.  Pelvic ultrasound showed small complex cyst to bilateral ovaries- recommend follow-up with GYN for continued evaluation and management of these findings.  Please verify that the patient does not have a pacemaker/defibrillator, metal implant, cerebral aneurysm or surgical clip, pump, middle or inner ear prosthetic, or nerve or brain stimulator, if so, please notify me so I can adjust the order accordingly. Please ask if patient is claustrophobic, if so, let me know.    Thanks,  Raven CRUZ

## 2017-03-21 ENCOUNTER — TELEPHONE (OUTPATIENT)
Dept: OTOLARYNGOLOGY | Facility: CLINIC | Age: 37
End: 2017-03-21

## 2017-03-21 NOTE — TELEPHONE ENCOUNTER
The Anusol wasn't covered by insurance and was switched to proctofoam which I am assuming this is the medication she is reporting that isn't covered. If not covered then recommend trying OTC hemorrhoid suppositories.  Thanks  KASI

## 2017-03-21 NOTE — TELEPHONE ENCOUNTER
----- Message from Max Hurst sent at 3/15/2017  4:16 PM CDT -----  Contact: pt  Pt is requesting a callback, pt states that the prescription that was called into pharmacy is not approved by insurance company needs something else  Call Back#[phone]  Thanks

## 2017-03-29 ENCOUNTER — HOSPITAL ENCOUNTER (OUTPATIENT)
Dept: RADIOLOGY | Facility: HOSPITAL | Age: 37
Discharge: HOME | End: 2017-03-29
Attending: NURSE PRACTITIONER
Payer: MEDICAID

## 2017-03-29 DIAGNOSIS — K92.1 HEMATOCHEZIA: ICD-10-CM

## 2017-03-29 DIAGNOSIS — K76.9 LIVER LESION: ICD-10-CM

## 2017-03-29 DIAGNOSIS — R10.9 LEFT SIDED ABDOMINAL PAIN: ICD-10-CM

## 2017-03-29 DIAGNOSIS — R10.819 SUPRAPUBIC TENDERNESS: ICD-10-CM

## 2017-03-29 DIAGNOSIS — K62.5 RECTAL BLEEDING: ICD-10-CM

## 2017-03-29 DIAGNOSIS — R63.4 WEIGHT LOSS: ICD-10-CM

## 2017-03-29 DIAGNOSIS — R19.4 CHANGE IN BOWEL HABITS: ICD-10-CM

## 2017-03-29 DIAGNOSIS — Z87.19 HISTORY OF HEMORRHOIDS: ICD-10-CM

## 2017-03-29 PROCEDURE — 74183 MRI ABD W/O CNTR FLWD CNTR: CPT | Mod: 26,,, | Performed by: RADIOLOGY

## 2017-03-29 PROCEDURE — 25500020 PHARM REV CODE 255: Performed by: NURSE PRACTITIONER

## 2017-03-29 PROCEDURE — 74183 MRI ABD W/O CNTR FLWD CNTR: CPT | Mod: TC

## 2017-03-29 PROCEDURE — A9585 GADOBUTROL INJECTION: HCPCS | Performed by: NURSE PRACTITIONER

## 2017-03-29 RX ORDER — GADOBUTROL 604.72 MG/ML
5 INJECTION INTRAVENOUS
Status: COMPLETED | OUTPATIENT
Start: 2017-03-29 | End: 2017-03-29

## 2017-03-29 RX ORDER — GADOBUTROL 604.72 MG/ML
INJECTION INTRAVENOUS
Status: DISCONTINUED
Start: 2017-03-29 | End: 2017-03-30 | Stop reason: HOSPADM

## 2017-03-29 RX ADMIN — GADOBUTROL 5 ML: 604.72 INJECTION INTRAVENOUS at 02:03

## 2017-03-30 ENCOUNTER — TELEPHONE (OUTPATIENT)
Dept: GASTROENTEROLOGY | Facility: CLINIC | Age: 37
End: 2017-03-30

## 2017-03-30 NOTE — TELEPHONE ENCOUNTER
Please call to inform & review the results with the patient- MRI of the abdomen showed 3 small liver hemangiomas (typically benign collection of blood vessels, recommend repeat imaging to monitor for stability in 12 months).  Otherwise normal findings. Continue with previous recommendations  Thanks,  Raven ARIAS FNP-C